# Patient Record
Sex: MALE | Race: WHITE | NOT HISPANIC OR LATINO | URBAN - METROPOLITAN AREA
[De-identification: names, ages, dates, MRNs, and addresses within clinical notes are randomized per-mention and may not be internally consistent; named-entity substitution may affect disease eponyms.]

---

## 2023-08-04 ENCOUNTER — TELEMEDICINE (OUTPATIENT)
Dept: PRIMARY CARE | Facility: CLINIC | Age: 33
End: 2023-08-04
Payer: COMMERCIAL

## 2023-08-04 DIAGNOSIS — L03.114 CELLULITIS OF LEFT UPPER EXTREMITY: Primary | ICD-10-CM

## 2023-08-04 DIAGNOSIS — T30.0 BURN: ICD-10-CM

## 2023-08-04 PROCEDURE — 99213 OFFICE O/P EST LOW 20 MIN: CPT | Performed by: FAMILY MEDICINE

## 2023-08-04 RX ORDER — CEPHALEXIN 500 MG/1
500 CAPSULE ORAL 2 TIMES DAILY
Qty: 20 CAPSULE | Refills: 0 | Status: SHIPPED | OUTPATIENT
Start: 2023-08-04 | End: 2023-08-14

## 2023-08-05 NOTE — PROGRESS NOTES
Chief complaints: Left forearm burn and now it is infected  HPI:  Patient states that he sustained left thumb forearm while he was helping his neighbor working on lawnmower and he accidentally touched a hot pipe.  The area was red and then it blistered and now the blister has opened up and then he noted red things around the area and is getting bigger and swollen.  He is concerned about infection.  No fever no chills.  He is able to move his left hand with all sensations preserved.    On exam: Patient was asked to palpate his axillary lymph nodes as good as he can and he said he did not feel any lumps or bumps in the axilla especially on the left side  On the video I noted there is a approximately dime size ulcer with dried ulcer crater and surrounding skin is red and swollen.  Assessment and plan: Burn now complicated by cellulitis.  Can use over-the-counter bacitracin ointment to the burn.  Keep area clean and dry.  Rx Keflex 5 mg twice a day for 10 days.  Follow-up with PCP as needed.